# Patient Record
Sex: FEMALE | Race: WHITE | NOT HISPANIC OR LATINO | Employment: FULL TIME | URBAN - METROPOLITAN AREA
[De-identification: names, ages, dates, MRNs, and addresses within clinical notes are randomized per-mention and may not be internally consistent; named-entity substitution may affect disease eponyms.]

---

## 2018-08-17 ENCOUNTER — OFFICE VISIT (OUTPATIENT)
Dept: OBGYN CLINIC | Facility: CLINIC | Age: 58
End: 2018-08-17
Payer: COMMERCIAL

## 2018-08-17 ENCOUNTER — APPOINTMENT (OUTPATIENT)
Dept: RADIOLOGY | Facility: CLINIC | Age: 58
End: 2018-08-17
Payer: COMMERCIAL

## 2018-08-17 VITALS
WEIGHT: 147.4 LBS | HEIGHT: 64 IN | SYSTOLIC BLOOD PRESSURE: 120 MMHG | BODY MASS INDEX: 25.16 KG/M2 | DIASTOLIC BLOOD PRESSURE: 78 MMHG | HEART RATE: 88 BPM

## 2018-08-17 DIAGNOSIS — M76.31 IT BAND SYNDROME, RIGHT: Primary | ICD-10-CM

## 2018-08-17 DIAGNOSIS — M25.561 RIGHT KNEE PAIN, UNSPECIFIED CHRONICITY: ICD-10-CM

## 2018-08-17 DIAGNOSIS — M76.51 PATELLAR TENDINITIS OF RIGHT KNEE: ICD-10-CM

## 2018-08-17 PROCEDURE — 99203 OFFICE O/P NEW LOW 30 MIN: CPT | Performed by: ORTHOPAEDIC SURGERY

## 2018-08-17 PROCEDURE — 73562 X-RAY EXAM OF KNEE 3: CPT

## 2018-08-17 NOTE — PROGRESS NOTES
Assessment/Plan:  1  It band syndrome, right  XR knee 3 vw right non injury   2  Patellar tendinitis of right knee       The patient appears to have IT band syndrome and patellar tendinitis  We wrote her for physical therapy today  We will see her back in 6 weeks for repeat evaluation  If she does not make progress with physical therapy we did discuss MRI in the future to rule out a lateral meniscus tear  Subjective:   Nika Grant is a 63 y o  female who presents today for evaluation of her right knee  She states that about a year ago she had some discomfort in this knee which gradually subsided and had been doing well up until around November when she joined a gym and started doing more exercises with her knee  She thinks this exacerbated her pain  She notes her discomfort to be about the lateral and anterior aspects of the knee which can radiate down the leg some at times  This is worse with getting up and down from a seated position and also with stairs  Rest and Aleve have helps some  She denies any mechanical symptoms, instability, or swelling  Review of Systems   Constitutional: Negative for chills, fever and unexpected weight change  HENT: Negative for hearing loss, nosebleeds and sore throat  Eyes: Negative for pain, redness and visual disturbance  Respiratory: Negative for cough, shortness of breath and wheezing  Cardiovascular: Negative for chest pain, palpitations and leg swelling  Gastrointestinal: Negative for abdominal pain, nausea and vomiting  Endocrine: Negative for polydipsia and polyuria  Genitourinary: Negative for dysuria and hematuria  Musculoskeletal:        See HPI   Skin: Negative for rash and wound  Neurological: Negative for dizziness, numbness and headaches  Psychiatric/Behavioral: Negative for decreased concentration and suicidal ideas  The patient is not nervous/anxious            Past Medical History:   Diagnosis Date    [facility] mild       Past Surgical History:   Procedure Laterality Date     SECTION      x3     OOPHORECTOMY         No family history on file  Social History     Occupational History    Not on file  Social History Main Topics    Smoking status: Former Smoker    Smokeless tobacco: Never Used    Alcohol use No    Drug use: No    Sexual activity: Not on file         Current Outpatient Prescriptions:     Calcium Carbonate-Vit D-Min (CALTRATE 600+D PLUS PO), Take by mouth, Disp: , Rfl:     Multiple Vitamins-Minerals (MULTIVITAMIN ADULT PO), Take by mouth, Disp: , Rfl:     Allergies   Allergen Reactions    Amoxicillin        Objective:  Vitals:    18 0816   BP: 120/78   Pulse: 88       Right Knee Exam     Tenderness   The patient is experiencing no tenderness  Range of Motion   Extension: normal   Flexion: normal     Tests   Nuvia:  Medial - negative Lateral - negative  Lachman:  Anterior - negative      Drawer:       Anterior - negative    Posterior - negative  Varus: negative  Valgus: negative    Other   Erythema: absent  Sensation: normal  Pulse: present  Swelling: none  Other tests: no effusion present          Observations     Right Knee   Negative for effusion  Physical Exam   Constitutional: She is oriented to person, place, and time  She appears well-developed and well-nourished  No distress  HENT:   Head: Normocephalic and atraumatic  Eyes: Conjunctivae and EOM are normal  No scleral icterus  Neck: No JVD present  Cardiovascular: Normal rate and intact distal pulses  Pulmonary/Chest: Effort normal  No respiratory distress  Abdominal: She exhibits no distension  Musculoskeletal:        Right knee: She exhibits no effusion  Neurological: She is alert and oriented to person, place, and time  Coordination normal    Skin: Skin is warm  Psychiatric: She has a normal mood and affect         I have personally reviewed pertinent films in PACS and my interpretation is as follows:  X-rays right knee:  Mild medial compartment joint space narrowing otherwise normal

## 2018-08-21 ENCOUNTER — TELEPHONE (OUTPATIENT)
Dept: OBGYN CLINIC | Facility: CLINIC | Age: 58
End: 2018-08-21

## 2018-08-21 NOTE — TELEPHONE ENCOUNTER
She can look up exercises for IT band syndrome and patellofemoral syndrome online  She should try to do them daily   She may need a tennis ball or foam roller to massage the IT band

## 2018-08-21 NOTE — TELEPHONE ENCOUNTER
Patient stopped in the office , wants to know if there are any print outs or websites she can obtain exercises from to do therapy   States that physical therapy is to expensive and she has to pay $45 everytime she goes   Was wondering if there was any alternative exercises she can do on her own    Please Advise

## 2020-12-28 ENCOUNTER — IMMUNIZATIONS (OUTPATIENT)
Dept: FAMILY MEDICINE CLINIC | Facility: HOSPITAL | Age: 60
End: 2020-12-28
Payer: COMMERCIAL

## 2020-12-28 DIAGNOSIS — Z23 ENCOUNTER FOR IMMUNIZATION: ICD-10-CM

## 2020-12-28 PROCEDURE — 0011A SARS-COV-2 / COVID-19 MRNA VACCINE (MODERNA) 100 MCG: CPT

## 2020-12-28 PROCEDURE — 91301 SARS-COV-2 / COVID-19 MRNA VACCINE (MODERNA) 100 MCG: CPT

## 2021-01-25 ENCOUNTER — IMMUNIZATIONS (OUTPATIENT)
Dept: FAMILY MEDICINE CLINIC | Facility: HOSPITAL | Age: 61
End: 2021-01-25

## 2021-01-25 DIAGNOSIS — Z23 ENCOUNTER FOR IMMUNIZATION: Primary | ICD-10-CM

## 2021-01-25 PROCEDURE — 0012A SARS-COV-2 / COVID-19 MRNA VACCINE (MODERNA) 100 MCG: CPT

## 2021-01-25 PROCEDURE — 91301 SARS-COV-2 / COVID-19 MRNA VACCINE (MODERNA) 100 MCG: CPT

## 2021-11-19 ENCOUNTER — IMMUNIZATIONS (OUTPATIENT)
Dept: FAMILY MEDICINE CLINIC | Facility: HOSPITAL | Age: 61
End: 2021-11-19

## 2021-11-19 DIAGNOSIS — Z23 ENCOUNTER FOR IMMUNIZATION: Primary | ICD-10-CM

## 2021-11-19 PROCEDURE — 0064A COVID-19 MODERNA VACC 0.25 ML BOOSTER: CPT

## 2021-11-19 PROCEDURE — 91306 COVID-19 MODERNA VACC 0.25 ML BOOSTER: CPT

## 2022-08-04 ENCOUNTER — APPOINTMENT (OUTPATIENT)
Dept: LAB | Facility: CLINIC | Age: 62
End: 2022-08-04

## 2022-08-26 ENCOUNTER — OFFICE VISIT (OUTPATIENT)
Dept: PODIATRY | Facility: CLINIC | Age: 62
End: 2022-08-26

## 2022-08-26 VITALS
RESPIRATION RATE: 16 BRPM | BODY MASS INDEX: 26.46 KG/M2 | DIASTOLIC BLOOD PRESSURE: 66 MMHG | WEIGHT: 155 LBS | SYSTOLIC BLOOD PRESSURE: 114 MMHG | HEIGHT: 64 IN

## 2022-08-26 DIAGNOSIS — M21.619 BUNION OF GREAT TOE: ICD-10-CM

## 2022-08-26 DIAGNOSIS — M21.962 ACQUIRED DEFORMITY OF BOTH FEET: ICD-10-CM

## 2022-08-26 DIAGNOSIS — M72.2 PLANTAR FASCIAL FIBROMATOSIS OF LEFT FOOT: Primary | ICD-10-CM

## 2022-08-26 DIAGNOSIS — M21.961 ACQUIRED DEFORMITY OF BOTH FEET: ICD-10-CM

## 2022-08-26 RX ORDER — MELOXICAM 15 MG/1
15 TABLET ORAL DAILY
Qty: 30 TABLET | Refills: 0 | Status: SHIPPED | OUTPATIENT
Start: 2022-08-26

## 2022-09-15 ENCOUNTER — OFFICE VISIT (OUTPATIENT)
Dept: PODIATRY | Facility: CLINIC | Age: 62
End: 2022-09-15

## 2022-09-15 VITALS
DIASTOLIC BLOOD PRESSURE: 66 MMHG | WEIGHT: 155 LBS | BODY MASS INDEX: 26.46 KG/M2 | HEIGHT: 64 IN | RESPIRATION RATE: 16 BRPM | SYSTOLIC BLOOD PRESSURE: 114 MMHG

## 2022-09-15 DIAGNOSIS — M21.961 ACQUIRED DEFORMITY OF BOTH FEET: ICD-10-CM

## 2022-09-15 DIAGNOSIS — M72.2 PLANTAR FASCIAL FIBROMATOSIS OF LEFT FOOT: Primary | ICD-10-CM

## 2022-09-15 DIAGNOSIS — M21.619 BUNION OF GREAT TOE: ICD-10-CM

## 2022-09-15 DIAGNOSIS — M21.962 ACQUIRED DEFORMITY OF BOTH FEET: ICD-10-CM

## 2022-09-27 ENCOUNTER — EVALUATION (OUTPATIENT)
Dept: PHYSICAL THERAPY | Facility: CLINIC | Age: 62
End: 2022-09-27
Payer: COMMERCIAL

## 2022-09-27 DIAGNOSIS — M72.2 PLANTAR FASCIAL FIBROMATOSIS OF LEFT FOOT: Primary | ICD-10-CM

## 2022-09-27 PROCEDURE — 97161 PT EVAL LOW COMPLEX 20 MIN: CPT | Performed by: PHYSICAL THERAPIST

## 2022-09-27 PROCEDURE — 97110 THERAPEUTIC EXERCISES: CPT | Performed by: PHYSICAL THERAPIST

## 2022-09-27 NOTE — PROGRESS NOTES
PT Evaluation     Today's date: 2022  Patient name: Mini Glass  : 1960  MRN: [id number]  Referring provider: Loren Ramirez DPM  Dx:   Encounter Diagnosis     ICD-10-CM    1  Plantar fascial fibromatosis of left foot  M72 2 Ambulatory referral to Physical Therapy                  Assessment  Assessment details: Mini Glass is a 65 y o  female who presents with pain and decreased ROM  Due to these impairments, patient has difficulty performing ADL's, work-related activities, ambulation  Patient's clinical presentation is consistent with their referring diagnosis of Plantar fascial fibromatosis of left foot  (primary encounter diagnosis)  Patient has been educated in home exercise program and plan of care  Patient would benefit from skilled physical therapy services to address their aforementioned functional limitations and progress towards prior level of function and independence with home exercise program      Impairments: abnormal gait, abnormal or restricted ROM, activity intolerance, impaired physical strength, lacks appropriate home exercise program, pain with function, weight-bearing intolerance and poor body mechanics  Barriers to therapy: Would like to perform HEP and come to therapy 1x/month  Understanding of Dx/Px/POC: good   Prognosis: good    Goals  Short Term Goals to be accomplished in 3 weeks:  STG1: Pt will be I with HEP  STG2: Pt will demo 50% inc in ankle AROM         Long Term Goals to be accomplished in 6 weeks:    LTG1:Pt will demo ankle AROM WNL   LTG2: Pt will return to household ADLs and work duties pain free as per [place]  Plan details:  HEP development, stretching, strengthening, A/AA/PROM, joint mobilizations, posture education, STM/MI as needed to reduce muscle tension, muscle reeducation, PLOC discussed and agreed upon with patient        Patient would benefit from: PT eval and skilled physical therapy  Planned modality interventions: cryotherapy and thermotherapy: hydrocollator packs  Planned therapy interventions: manual therapy, neuromuscular re-education, self care, therapeutic activities, therapeutic exercise and home exercise program  Frequency: 2x week  Duration in weeks: 6  Treatment plan discussed with: patient        Subjective Evaluation    History of Present Illness  Mechanism of injury: Pt reports that during  she was working from home her feet started to hurt because she was wokring from home and not wearing shoes  In March of this year her L foot started to hurt while she was wlaking for exercise  Its been worsening lately  Her pain is increased when she is on her feet, when she starts to walk and is able to walk it out  She does occass get pain in foot when lying in bed and the weather changes  R foot has no pain  She reports this causes her discomfort every time she stands up, which is aggravating as she sits for work all day  Using voltaren gel  Pt is hoping to participate in a home program ands follow up next month if necessary  Quality of life: good    Pain  At best pain ratin  At worst pain ratin          Objective     Observations     Additional Observation Details  Wearing compression stocking on L  Just started wearing orthotics  Active Range of Motion   Left Ankle/Foot   Dorsiflexion (ke): 5 degrees   Plantar flexion: 65 degrees   Inversion: 45 degrees   Eversion: 20 degrees   Great toe extension: 45 degrees     Right Ankle/Foot   Dorsiflexion (ke): 8 degrees   Plantar flexion: 65 degrees   Inversion: 45 degrees   Eversion: 20 degrees   Great toe extension: 45 degrees     Strength/Myotome Testing     Left Ankle/Foot   Normal strength    Right Ankle/Foot   Normal strength              Eval/ Re-eval Auth #/ Referral # Total visits Start date  Expiration date Total active units  Total manual units  PT only or  PT+OT?     NO AUTH REQ                                                        Date:           Total authorized units:  Active:            Manual:           Total remaining units:  Active:               Manual:                Date:           Total authorized units: Active:            Manual:           Total remaining units:  Active:               Manual:                    Precautions: Standard        Visit 1             9/27/22                                                   Neuro Re-Ed                                                                                                        Ther Ex             DF self lunge 10x            Step stretch 5x10s            Toe ext stretch standing 10x                                                                             Ther Activity                                       Gait Training                                       Modalities

## 2022-09-27 NOTE — LETTER
2022    Vera Fam  [address]  [address]    Patient: Jessica Fan   YOB: 1960   Date of Visit: 2022     Encounter Diagnosis     ICD-10-CM    1  Plantar fascial fibromatosis of left foot  M72 2 Ambulatory referral to Physical Therapy       Dear Dr Letty Stevens:    Thank you for your recent referral of Jessica Fan  Please review the attached evaluation summary from Susan's recent visit  Please verify that you agree with the plan of care by signing the attached order  If you have any questions or concerns, please do not hesitate to call  I sincerely appreciate the opportunity to share in the care of one of your patients and hope to have another opportunity to work with you in the near future  Sincerely,    Eduard Anderson, PT      Referring Provider:      I certify that I have read the below Plan of Care and certify the need for these services furnished under this plan of treatment while under my care  Dari Downey DPM  [address] [place] [address]  Via Fax:  [phone]          PT Evaluation     Today's date: 2022  Patient name: Jessica Fan  : 1960  MRN: [id number]  Referring provider: Kenan Gallegos DPM  Dx:   Encounter Diagnosis     ICD-10-CM    1  Plantar fascial fibromatosis of left foot  M72 2 Ambulatory referral to Physical Therapy                  Assessment  Assessment details: Jessica Fan is a 65 y o  female who presents with pain and decreased ROM  Due to these impairments, patient has difficulty performing ADL's, work-related activities, ambulation  Patient's clinical presentation is consistent with their referring diagnosis of Plantar fascial fibromatosis of left foot  (primary encounter diagnosis)  Patient has been educated in home exercise program and plan of care   Patient would benefit from skilled physical therapy services to address their aforementioned functional limitations and progress towards prior level of function and independence with home exercise program      Impairments: abnormal gait, abnormal or restricted ROM, activity intolerance, impaired physical strength, lacks appropriate home exercise program, pain with function, weight-bearing intolerance and poor body mechanics  Barriers to therapy: Would like to perform HEP and come to therapy 1x/month  Understanding of Dx/Px/POC: good   Prognosis: good    Goals  Short Term Goals to be accomplished in 3 weeks:  STG1: Pt will be I with HEP  STG2: Pt will demo 50% inc in ankle AROM         Long Term Goals to be accomplished in 6 weeks:    LTG1:Pt will demo ankle AROM WNL   LTG2: Pt will return to household ADLs and work duties pain free as per [employer] [facility]  Plan details:  HEP development, stretching, strengthening, A/AA/PROM, joint mobilizations, posture education, STM/MI as needed to reduce muscle tension, muscle reeducation, PLOC discussed and agreed upon with patient  Patient would benefit from: PT eval and skilled physical therapy  Planned modality interventions: cryotherapy and thermotherapy: hydrocollator packs  Planned therapy interventions: manual therapy, neuromuscular re-education, self care, therapeutic activities, therapeutic exercise and home exercise program  Frequency: 2x week  Duration in weeks: 6  Treatment plan discussed with: patient        Subjective Evaluation    History of Present Illness  Mechanism of injury: Pt reports that during [place] she was working from home her feet started to hurt because she was wokring from home and not wearing shoes  In March of this year her L foot started to hurt while she was wlaking for exercise  Its been worsening lately  Her pain is increased when she is on her feet, when she starts to walk and is able to walk it out  She does occass get pain in foot when lying in bed and the weather changes  R foot has no pain   She reports this causes her discomfort every time she stands up, which is aggravating as she sits for work all day  Using voltaren gel  Pt is hoping to participate in a home program ands follow up next month if necessary  Quality of life: good    Pain  At best pain ratin  At worst pain ratin          Objective     Observations     Additional Observation Details  Wearing compression stocking on L  Just started wearing orthotics  Active Range of Motion   Left Ankle/Foot   Dorsiflexion (ke): 5 degrees   Plantar flexion: 65 degrees   Inversion: 45 degrees   Eversion: 20 degrees   Great toe extension: 45 degrees     Right Ankle/Foot   Dorsiflexion (ke): 8 degrees   Plantar flexion: 65 degrees   Inversion: 45 degrees   Eversion: 20 degrees   Great toe extension: 45 degrees     Strength/Myotome Testing     Left Ankle/Foot   Normal strength    Right Ankle/Foot   Normal strength              Eval/ Re-eval Auth #/ Referral # Total visits Start date  Expiration date Total active units  Total manual units  PT only or  PT+OT? NO AUTH REQ                                                        Date:           Total authorized units:  Active:            Manual:           Total remaining units:  Active:               Manual:                Date:           Total authorized units: Active:            Manual:           Total remaining units:  Active:               Manual:                    Precautions: Standard        Visit 1             22                                                   Neuro Re-Ed                                                                                                        Ther Ex             DF self lunge 10x            Step stretch 5x10s            Toe ext stretch standing 10x                                                                             Ther Activity                                       Gait Training                                       Modalities

## 2022-11-18 NOTE — PROGRESS NOTES
Assessment/Plan:    Patient evaluated discussed with the patient treatment option including conservative versus surgical management of plantar fasciitis, discussed with the patient stretching exercises proper shoe gear and the use of orthotics, 1 pair of OTC orthotics this fitted and dispensed to the patient, patient educated on its use  Patient refused steroid injection at this time, patient will use meloxicam for pain management and Voltaren gel occasionally  Discussed possible physical therapy, if condition is not improved  Three views x-rays evidence of plantar flex 1st metatarsal, HAV deformity, no heel spur     Diagnoses and all orders for this visit:    Plantar fascial fibromatosis of left foot  -     Diclofenac Sodium (VOLTAREN) 1 %; Apply 2 g topically 4 (four) times a day  -     meloxicam (Mobic) 15 mg tablet; Take 1 tablet (15 mg total) by mouth daily    Acquired deformity of both feet    Bunion of great toe          Subjective:      Patient ID: Harman Giang is a 65 y o  female  29-year-old female with no pertinent Podiatry past medical history presents to the office with pain to the heel, left more than right, patient states that the pain increases after a period of dyskinesia most intense in the morning, patient denies constitutional symptoms, patient denies recent trauma to the foot      The following portions of the patient's history were reviewed and updated as appropriate: allergies, current medications, past family history, past medical history, past social history, past surgical history and problem list     Review of Systems   Constitutional: Negative  Respiratory: Negative  Cardiovascular: Negative  Musculoskeletal: Positive for arthralgias  Skin: Positive for color change                 Historical Information   Past Medical History:   Diagnosis Date   • Sjoegren syndrome     mild     Past Surgical History:   Procedure Laterality Date   •  SECTION      x3    • OOPHORECTOMY       Social History   Social History     Substance and Sexual Activity   Alcohol Use No     Social History     Substance and Sexual Activity   Drug Use No     Social History     Tobacco Use   Smoking Status Former   Smokeless Tobacco Never     Family History: No family history on file  Meds/Allergies   all medications and allergies reviewed  Allergies   Allergen Reactions   • Amoxicillin        Objective:      /66   Resp 16   Ht 5' 3 5" (1 613 m)   Wt 70 3 kg (155 lb)   BMI 27 03 kg/m²          Physical Exam  Constitutional:       General: She is active  She is not in acute distress  Appearance: She is well-developed and well-nourished  She is not ill-appearing, toxic-appearing, sickly-appearing or diaphoretic  HENT:      Head: Normocephalic and atraumatic  Cardiovascular:      Pulses: Normal pulses  Dorsalis pedis pulses are 2+ on the right side and 2+ on the left side  Posterior tibial pulses are 2+ on the right side and 2+ on the left side  Pulmonary:      Effort: No respiratory distress  Musculoskeletal:         General: Normal range of motion  Comments: Pain on palpation to the medial calcaneal tuberosity on the left more than right, and along the insertion of the medial fascia bilateral, mild localized edema noted, no erythema, no pain on range of motion of ankle joint or ST joint  HAV deformity bilateral left worse than right, with pain on palpation to the medial dorsal eminence on the left, track bound noted at the left, hammertoe deformity bilateral lower extremities equinus gastroc in origin bilateral  Plantar flexed 1st ray bilateral lower extremities on x-rays   Feet:      Right foot:      Protective Sensation: 10 sites tested  10 sites sensed  Skin integrity: No ulcer, blister, skin breakdown or erythema  Left foot:      Protective Sensation: 10 sites tested  10 sites sensed        Skin integrity: No ulcer, blister, skin breakdown or erythema  Skin:     General: Skin is intact  Capillary Refill: Capillary refill takes 2 to 3 seconds  Coloration: Skin is not pale  Comments: No open lesion, no erythema, mild localized edema plantar heel on the right   Neurological:      Mental Status: She is alert and oriented to person, place, and time  Foot Exam    Right Foot/Ankle     Inspection and Palpation  Skin Exam: no blister, no maceration, no ulcer and no erythema     Neurovascular  Dorsalis pedis: 2+  Posterior tibial: 2+      Left Foot/Ankle      Inspection and Palpation  Skin Exam: no blister, no maceration, no ulcer and no erythema     Neurovascular  Dorsalis pedis: 2+  Posterior tibial: 2+              Portions of the record may have been created with voice recognition software  Occasional wrong word or "sound a like" substitutions may have occurred due to the inherent limitations of voice recognition software  Read the chart carefully and recognize, using context, where substitutions have occurred

## 2022-12-01 ENCOUNTER — EVALUATION (OUTPATIENT)
Dept: PHYSICAL THERAPY | Facility: CLINIC | Age: 62
End: 2022-12-01

## 2022-12-01 DIAGNOSIS — M79.672 HEEL PAIN, BILATERAL: ICD-10-CM

## 2022-12-01 DIAGNOSIS — M79.671 HEEL PAIN, BILATERAL: ICD-10-CM

## 2022-12-01 DIAGNOSIS — M72.2 PLANTAR FASCIITIS, BILATERAL: Primary | ICD-10-CM

## 2022-12-01 NOTE — PROGRESS NOTES
PT Re-Evaluation   Today's date: 2022  Patient name: Spring Woodward  : 1960  MRN: [id number]  Referring provider: Heidi Noriega DPM  Dx:   Encounter Diagnosis     ICD-10-CM    1  Plantar fasciitis, bilateral  M72 2       2  Heel pain, bilateral  M79 671     [id number]             Assessment  Assessment details: Patient is a 65 y o  Female who presents with referring diagnosis of plantar fasciitis on the left foot  Patient's greatest concern is worry over not knowing what's wrong and fear of not being able to keep active  Primary movement impairment diagnosis of hypomobility on the right foot resulting in pathoanatomical symptoms of pain with walking at end of day, pain with gastroc stretch on step  The aforementioned impairments have limited the patient's ability to walk late at night  No further referral appears necessary at this time based upon examination results    Patient education performed during today's session included: prognosis and diagnosis, HEP and PT expectations  Would like to perform HEP independently with one f/u for form  Primary movement impairments:  1) Hypomobility at the subtalar joint    Etiological factors include: none    Patient verbalized understanding of POC  Please contact me if you have any questions or recommendations  Thank you for the referral and the opportunity to share in [address] [place] [address] care          Plan  Impairments: abnormal gait, abnormal or restricted ROM, activity intolerance, impaired physical strength, lacks appropriate home exercise program, pain with function, weight-bearing intolerance and poor body mechanics  Barriers to therapy: Would like to perform HEP and come to therapy 1x/month  Understanding of Dx/Px/POC: good   Prognosis: good   Goals  Short Term Goals to be accomplished in 3 weeks:  STG1: Pt will be I with HEP  STG2: Pt will demo 50% inc in ankle AROM    Long Term Goals to be accomplished in 6 weeks:    LTG1:Pt will demo ankle AROM WNL   LTG2: Pt will return to household ADLs and work duties pain free as per [place]    Plan details:  HEP development, stretching, strengthening, A/AA/PROM, joint mobilizations, posture education, STM/MI as needed to reduce muscle tension, muscle reeducation, PLOC discussed and agreed upon with patient  Patient would benefit from: PT eval and skilled physical therapy  Planned modality interventions: cryotherapy and thermotherapy: hydrocollator packs  Planned therapy interventions: manual therapy, neuromuscular re-education, self care, therapeutic activities, therapeutic exercise and home exercise program  Frequency: 2x week  Duration in weeks: 6  Treatment plan discussed with: patient      Subjective    History of Present Illness  - Mechanism of injury: Patient was seen in 2022 for plantar fasciitis and was given a HEP  2 weeks after that visit she had pain start on the R foot  Now it is on both feet, denies changes in intensity  She did increase her activity level - she has been doing exercise tapes Shelby Luna) and fast walking Linzie Comp) in the evening and lots of stretching  She has continued working on HEP but not as consistently  The one on the step where she drops the heels really bothers the right foot  She feels she has more ROM on the left  Denies back pain  She got new sneakers and is wearing her orthotics  She stopped wearing the compressions socks this week because she shrunk     - Occupation - desk work; [employer]      Pain  - Current pain ratin/10  - At best pain ratin/10  - At worst pain ratin/10  - Location: lateral aspect of the heels  - Aggravating factors: standing at night    Objective      Active Range of Motion   Left Ankle/Foot   Dorsiflexion (ke): 0 degrees   Plantar flexion: 75 degrees   Inversion: 45 degrees   Eversion: 20 degrees   Great toe extension: 45 degrees      Right Ankle/Foot Dorsiflexion (ke): 0 degrees   Plantar flexion: 75 degrees   Inversion: 45 degrees   Eversion: 20 degrees   Great toe extension: 45 degrees      Strength/Myotome Testing      Left Ankle/Foot   Normal strength     Right Ankle/Foot   Normal strength    Joint Mobility  TCJ WNL B  Subtalar joint - hypomobile on R         Insurance:  AMA/CMS Eval/ Re-eval POC expires Evelene Ray #/ Referral # Total units  Start date  Expiration date Extension  Visit limitation? PT only or  PT+OT? Co-Insurance   AMA 12/1/2022 12 weeks - 2/23/2023 - approved  9/27 12/31                                                                   Patient provided verbal consent to treatment plan and recommended interventions       Visit 1  2                      9/27/22 12/1/22                    Manual                        STJ mobs   performed 5'                                           Neuro Re-Ed                                                                                                                                                                                               Ther Ex                       DF self lunge 10x  VR                   Step stretch 5x10s  Reviewed 2x10s                   Toe ext stretch standing 10x  VR                                                                                                                                           Ther Activity                        Re evaluation   performed                                           Gait Training                                                                       Modalities                                                     Precautions: none  Past Medical History:   Diagnosis Date   • Sjoegren syndrome     mild

## 2022-12-04 NOTE — PROGRESS NOTES
Assessment/Plan:    Patient evaluated discussed with the patient treatment option including conservative versus surgical management of plantar fasciitis, discussed with the patient stretching exercises proper shoe gear and the use of orthotics, 1 pair of OTC orthotics this fitted and dispensed to the patient, patient educated on its use  Patient refused steroid injection at this time, patient will use meloxicam for pain management and Voltaren gel occasionally    Physical therapy ordered     Diagnoses and all orders for this visit:    Plantar fascial fibromatosis of left foot  -     Ambulatory referral to Physical Therapy; Future    Acquired deformity of both feet    Bunion of great toe          Subjective:      Patient ID: Mini Glass is a 65 y o  female  Follow-up on foot deformity, and plantar fasciitis, patient report improvement in pain with stretches but no resolution      The following portions of the patient's history were reviewed and updated as appropriate: allergies, current medications, past family history, past medical history, past social history, past surgical history and problem list     Review of Systems   Constitutional: Negative  Respiratory: Negative  Cardiovascular: Negative  Musculoskeletal: Positive for arthralgias  Skin: Positive for color change  Historical Information   Past Medical History:   Diagnosis Date   • Sjoegren syndrome     mild     Past Surgical History:   Procedure Laterality Date   •  SECTION      x3    • OOPHORECTOMY       Social History   Social History     Substance and Sexual Activity   Alcohol Use No     Social History     Substance and Sexual Activity   Drug Use No     Social History     Tobacco Use   Smoking Status Former   Smokeless Tobacco Never     Family History: No family history on file      Meds/Allergies   all medications and allergies reviewed  Allergies   Allergen Reactions   • Amoxicillin        Objective:      /66 Resp 16   Ht 5' 3 5" (1 613 m)   Wt 70 3 kg (155 lb)   BMI 27 03 kg/m²          Physical Exam  Constitutional:       General: She is not in acute distress  Appearance: She is well-developed  She is not ill-appearing, toxic-appearing or diaphoretic  HENT:      Head: Normocephalic and atraumatic  Cardiovascular:      Pulses: Normal pulses  Dorsalis pedis pulses are 2+ on the right side and 2+ on the left side  Posterior tibial pulses are 2+ on the right side and 2+ on the left side  Pulmonary:      Effort: No respiratory distress  Musculoskeletal:         General: Normal range of motion  Comments: Pain on palpation to the medial calcaneal tuberosity on the left more than right, and along the insertion of the medial fascia bilateral, mild localized edema noted, no erythema, no pain on range of motion of ankle joint or ST joint  HAV deformity bilateral left worse than right, with pain on palpation to the medial dorsal eminence on the left, track bound noted at the left, hammertoe deformity bilateral lower extremities equinus gastroc in origin bilateral  Plantar flexed 1st ray bilateral lower extremities on x-rays   Feet:      Right foot:      Protective Sensation: 10 sites tested  10 sites sensed  Skin integrity: No ulcer, blister, skin breakdown or erythema  Left foot:      Protective Sensation: 10 sites tested  10 sites sensed  Skin integrity: No ulcer, blister, skin breakdown or erythema  Skin:     Capillary Refill: Capillary refill takes 2 to 3 seconds  Coloration: Skin is not pale  Comments: No open lesion, no erythema, mild localized edema plantar heel on the right   Neurological:      Mental Status: She is alert and oriented to person, place, and time        Foot Exam    Right Foot/Ankle     Inspection and Palpation  Skin Exam: no blister, no maceration, no ulcer and no erythema     Neurovascular  Dorsalis pedis: 2+  Posterior tibial: 2+      Left Foot/Ankle      Inspection and Palpation  Skin Exam: no blister, no maceration, no ulcer and no erythema     Neurovascular  Dorsalis pedis: 2+  Posterior tibial: 2+              Portions of the record may have been created with voice recognition software  Occasional wrong word or "sound a like" substitutions may have occurred due to the inherent limitations of voice recognition software  Read the chart carefully and recognize, using context, where substitutions have occurred

## 2022-12-08 ENCOUNTER — OFFICE VISIT (OUTPATIENT)
Dept: PHYSICAL THERAPY | Facility: CLINIC | Age: 62
End: 2022-12-08

## 2022-12-08 DIAGNOSIS — M79.671 HEEL PAIN, BILATERAL: ICD-10-CM

## 2022-12-08 DIAGNOSIS — M72.2 PLANTAR FASCIITIS, BILATERAL: Primary | ICD-10-CM

## 2022-12-08 DIAGNOSIS — M79.672 HEEL PAIN, BILATERAL: ICD-10-CM

## 2022-12-08 NOTE — PROGRESS NOTES
Daily Note     Today's date: 2022  Patient name: Jessica Fan  : 1960  MRN: [id number]  Referring provider: Kenan Gallegos DPM  Dx:   Encounter Diagnosis     ICD-10-CM    1  Plantar fasciitis, bilateral  M72 2       2  Heel pain, bilateral  M79 671     M79 672                      Subjective: Patient reports the feet are doing better, but she does have some persistent pain on the medial aspect of the left heel  Objective: See treatment diary below      Assessment: Tolerated treatment well  Pain absolved with manual interventions  Introduced towel stretch in order to maximize stretch of PF and heel raise with inversion to recruit invertors that are contributing to stiffness at medial aspect of the heel  Some mild pain production with heel raises limiting volume  Patient exhibited good technique with therapeutic exercises and would benefit from continued PT  Plan: Continue per plan of care  Follow up in 2 weeks  Insurance:  AMA/CMS Eval/ Re-eval POC expires Tino Rene #/ Referral # Total units  Start date  Expiration date Extension  Visit limitation? PT only or  PT+OT? Co-Insurance   AMA 2022 12 weeks - 2023 - approved                                                                     Patient provided verbal consent to treatment plan and recommended interventions       Visit 1  2   3                   22                  Manual                        STJ mobs   performed 5'  performed 8'                                         Neuro Re-Ed                       Heel raise w inv     x10                                                                                                                                                                 Ther Ex                       DF self lunge 10x  VR                   Step stretch 5x10s  Reviewed 2x10s                   Toe ext stretch standing 10x  VR                    Towel stretch     10x10s Ther Activity                        Re evaluation   performed                    Patient education     Insoles                  Gait Training                                                                       Modalities                                                     Precautions: none  Past Medical History:   Diagnosis Date   • Sjoegren syndrome     mild

## 2022-12-28 ENCOUNTER — APPOINTMENT (OUTPATIENT)
Dept: PHYSICAL THERAPY | Facility: CLINIC | Age: 62
End: 2022-12-28

## 2023-01-04 ENCOUNTER — OFFICE VISIT (OUTPATIENT)
Dept: PHYSICAL THERAPY | Facility: CLINIC | Age: 63
End: 2023-01-04

## 2023-01-04 DIAGNOSIS — M79.671 HEEL PAIN, BILATERAL: ICD-10-CM

## 2023-01-04 DIAGNOSIS — M72.2 PLANTAR FASCIITIS, BILATERAL: Primary | ICD-10-CM

## 2023-01-04 DIAGNOSIS — M79.672 HEEL PAIN, BILATERAL: ICD-10-CM

## 2023-01-04 NOTE — PROGRESS NOTES
Daily Note     Today's date: 2023  Patient name: Luvenia Severance  : 1960  MRN: [id number]  Referring provider: Aimee Chowdary DPM  Dx:   Encounter Diagnosis     ICD-10-CM    1  Plantar fasciitis, bilateral  M72 2       2  Heel pain, bilateral  M79 671     [id number]                      Subjective: Patient reports the feet are doing better, it has been fluctuating a lot with the weather  They mostly start hurting at the end of the day  She stopped wearing the insert  Objective: See treatment diary below  Pain  - Current pain ratin/10  - At best pain ratin/10  - At worst pain ratin/10  - Location: lateral aspect of the heels  - Aggravating factors: standing at night    Objective      Active Range of Motion   Left Ankle/Foot   Dorsiflexion (ke): 0 degrees   Plantar flexion: 75 degrees   Inversion: 45 degrees   Eversion: 20 degrees   Great toe extension: 45 degrees      Right Ankle/Foot   Dorsiflexion (ke): 0 degrees   Plantar flexion: 75 degrees   Inversion: 45 degrees   Eversion: 20 degrees   Great toe extension: 45 degrees      Strength/Myotome Testing      Left Ankle/Foot   Normal strength     Right Ankle/Foot   Normal strength    Joint Mobility  TCJ WNL B  Subtalar joint WNL B    Assessment: Tolerated treatment well  Pain reduced on the right and minimally present on the left at end of session  Patient demonstrates major imrovement in PF symptoms with no pain in the morning, and persistent evening pain attributed to poor footwear choices  Educated patient on consistent wear for footwear choices and to return if sx worse or fail to improve  Patient discharged with HEP  Plan: Discharge          Insurance:  AMA/CMS Eval/ Re-eval POC expires Philippe Saldana #/ Referral # Total units  Start date  Expiration date Extension  Visit limitation? PT only or  PT+OT?  Co-Insurance   AMA 2022 12 weeks - 2023 - approved   Patient provided verbal consent to treatment plan and recommended interventions       Visit 1  2   3  4                 9/27/22 12/1/22 12/8 1/4                Manual                        STJ mobs   performed 5'  performed 8' performed 8'                                       Neuro Re-Ed                       Heel raise w inv     x10  2x10                                                                                                                                                               Ther Ex                       DF self lunge 10x  VR    10x10s               Step stretch 5x10s  Reviewed 2x10s    10x10s               Toe ext stretch standing 10x  VR   Standing on wall 10x10s                Towel stretch     10x10s  10x10s                                                                                                               Ther Activity                        Re evaluation   performed    performed 5'                Patient education     Insoles   insoles and sneakers               Gait Training                                                                       Modalities                                                     Precautions: none  Past Medical History:   Diagnosis Date   • Sjoegren syndrome     mild

## 2023-08-03 ENCOUNTER — APPOINTMENT (OUTPATIENT)
Dept: LAB | Facility: CLINIC | Age: 63
End: 2023-08-03
Payer: COMMERCIAL

## 2023-08-03 ENCOUNTER — TRANSCRIBE ORDERS (OUTPATIENT)
Dept: LAB | Facility: CLINIC | Age: 63
End: 2023-08-03

## 2023-08-03 DIAGNOSIS — Z00.8 HEALTH EXAMINATION IN POPULATION SURVEY: Primary | ICD-10-CM

## 2023-08-03 PROCEDURE — 36415 COLL VENOUS BLD VENIPUNCTURE: CPT

## 2023-08-03 PROCEDURE — 80061 LIPID PANEL: CPT

## 2023-08-03 PROCEDURE — 83036 HEMOGLOBIN GLYCOSYLATED A1C: CPT

## 2023-08-04 LAB
CHOLEST SERPL-MCNC: 219 MG/DL
EST. AVERAGE GLUCOSE BLD GHB EST-MCNC: 103 MG/DL
HBA1C MFR BLD: 5.2 %
HDLC SERPL-MCNC: 67 MG/DL
LDLC SERPL CALC-MCNC: 137 MG/DL (ref 0–100)
NONHDLC SERPL-MCNC: 152 MG/DL
TRIGL SERPL-MCNC: 73 MG/DL

## 2024-08-09 ENCOUNTER — APPOINTMENT (OUTPATIENT)
Dept: LAB | Facility: CLINIC | Age: 64
End: 2024-08-09
Payer: COMMERCIAL

## 2024-08-09 ENCOUNTER — TRANSCRIBE ORDERS (OUTPATIENT)
Dept: LAB | Facility: CLINIC | Age: 64
End: 2024-08-09

## 2024-08-09 DIAGNOSIS — Z00.8 HEALTH EXAMINATION IN POPULATION SURVEYS: ICD-10-CM

## 2024-08-09 DIAGNOSIS — Z00.8 HEALTH EXAMINATION IN POPULATION SURVEYS: Primary | ICD-10-CM

## 2024-08-09 LAB
CHOLEST SERPL-MCNC: 231 MG/DL
EST. AVERAGE GLUCOSE BLD GHB EST-MCNC: 105 MG/DL
HBA1C MFR BLD: 5.3 %
HDLC SERPL-MCNC: 65 MG/DL
LDLC SERPL CALC-MCNC: 143 MG/DL (ref 0–100)
NONHDLC SERPL-MCNC: 166 MG/DL
TRIGL SERPL-MCNC: 113 MG/DL

## 2024-08-09 PROCEDURE — 80061 LIPID PANEL: CPT

## 2024-08-09 PROCEDURE — 36415 COLL VENOUS BLD VENIPUNCTURE: CPT

## 2024-08-09 PROCEDURE — 83036 HEMOGLOBIN GLYCOSYLATED A1C: CPT

## 2025-07-25 ENCOUNTER — TRANSCRIBE ORDERS (OUTPATIENT)
Dept: LAB | Facility: CLINIC | Age: 65
End: 2025-07-25

## 2025-07-25 ENCOUNTER — APPOINTMENT (OUTPATIENT)
Dept: LAB | Facility: CLINIC | Age: 65
End: 2025-07-25
Payer: COMMERCIAL

## 2025-07-25 DIAGNOSIS — Z00.8 HEALTH EXAMINATION IN POPULATION SURVEYS: Primary | ICD-10-CM

## 2025-07-25 DIAGNOSIS — Z00.8 HEALTH EXAMINATION IN POPULATION SURVEYS: ICD-10-CM

## 2025-07-25 LAB
CHOLEST SERPL-MCNC: 229 MG/DL (ref ?–200)
EST. AVERAGE GLUCOSE BLD GHB EST-MCNC: 100 MG/DL
HBA1C MFR BLD: 5.1 %
HDLC SERPL-MCNC: 65 MG/DL
LDLC SERPL CALC-MCNC: 145 MG/DL (ref 0–100)
NONHDLC SERPL-MCNC: 164 MG/DL
TRIGL SERPL-MCNC: 95 MG/DL (ref ?–150)

## 2025-07-25 PROCEDURE — 36415 COLL VENOUS BLD VENIPUNCTURE: CPT

## 2025-07-25 PROCEDURE — 80061 LIPID PANEL: CPT

## 2025-07-25 PROCEDURE — 83036 HEMOGLOBIN GLYCOSYLATED A1C: CPT
